# Patient Record
Sex: MALE | Race: OTHER | URBAN - METROPOLITAN AREA
[De-identification: names, ages, dates, MRNs, and addresses within clinical notes are randomized per-mention and may not be internally consistent; named-entity substitution may affect disease eponyms.]

---

## 2024-10-07 ENCOUNTER — EMERGENCY (EMERGENCY)
Facility: HOSPITAL | Age: 49
LOS: 0 days | Discharge: HOME | End: 2024-10-07
Attending: EMERGENCY MEDICINE
Payer: OTHER MISCELLANEOUS

## 2024-10-07 VITALS
RESPIRATION RATE: 17 BRPM | SYSTOLIC BLOOD PRESSURE: 151 MMHG | HEIGHT: 74 IN | DIASTOLIC BLOOD PRESSURE: 87 MMHG | HEART RATE: 68 BPM | OXYGEN SATURATION: 98 % | TEMPERATURE: 98 F | WEIGHT: 265 LBS
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
## 2024-10-07 DIAGNOSIS — M54.50 LOW BACK PAIN, UNSPECIFIED: ICD-10-CM
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
## 2024-10-07 PROCEDURE — 99053 MED SERV 10PM-8AM 24 HR FAC: CPT

## 2024-10-07 PROCEDURE — 99284 EMERGENCY DEPT VISIT MOD MDM: CPT

## 2024-10-07 RX ORDER — ACETAMINOPHEN 325 MG
975 TABLET ORAL ONCE
Refills: 0 | Status: COMPLETED | OUTPATIENT
Start: 2024-10-07 | End: 2024-10-07

## 2024-10-07 RX ORDER — LIDOCAINE 50 MG/G
1 CREAM TOPICAL ONCE
Refills: 0 | Status: COMPLETED | OUTPATIENT
Start: 2024-10-07 | End: 2024-10-07

## 2024-10-07 RX ORDER — LIDOCAINE 50 MG/G
1 CREAM TOPICAL
Qty: 1 | Refills: 0
Start: 2024-10-07

## 2024-10-07 RX ORDER — CYCLOBENZAPRINE HCL 10 MG
10 TABLET ORAL ONCE
Refills: 0 | Status: COMPLETED | OUTPATIENT
Start: 2024-10-07 | End: 2024-10-07

## 2024-10-07 RX ORDER — CYCLOBENZAPRINE HCL 10 MG
1 TABLET ORAL
Qty: 9 | Refills: 0
Start: 2024-10-07 | End: 2024-10-09

## 2024-10-07 RX ADMIN — Medication 975 MILLIGRAM(S): at 08:29

## 2024-10-07 RX ADMIN — Medication 10 MILLIGRAM(S): at 08:29

## 2024-10-07 RX ADMIN — Medication 975 MILLIGRAM(S): at 09:50

## 2024-10-07 RX ADMIN — Medication 600 MILLIGRAM(S): at 08:29

## 2024-10-07 RX ADMIN — LIDOCAINE 1 PATCH: 50 CREAM TOPICAL at 08:28

## 2024-10-07 RX ADMIN — Medication 600 MILLIGRAM(S): at 09:50

## 2024-10-07 NOTE — ED PROVIDER NOTE - OBJECTIVE STATEMENT
At about 5a today the patient was working (he is a [place] ) and after picking up some garbage he felt sudden non radiating left lower back pain superior to the buttocks. The pain is worse when he bends over. He told his supervisor and then he came to the ED. He has gotten mild relief with a heat pack.

## 2024-10-07 NOTE — ED PROVIDER NOTE - PATIENT PORTAL LINK FT
You can access the FollowMyHealth Patient Portal offered by [facility] by registering at the following website: [email or web address]. By joining [email or web address]’s FollowMyHealth portal, you will also be able to view your health information using other applications (apps) compatible with our system.

## 2024-10-07 NOTE — ED PROVIDER NOTE - CLINICAL SUMMARY MEDICAL DECISION MAKING FREE TEXT BOX
49M c/o sudden LBP after lifting a heavy object  -high concern for radiculopathy vs muscle spasm vs strain  -analgesia, muscle relaxants  -ortho spine referral

## 2024-10-07 NOTE — ED PROVIDER NOTE - CARE PROVIDER_API CALL
Raciel Simon  Orthopaedic Surgery  [address], [address]  [place] [address]  Phone: [phone]  Fax: [phone]  Follow Up Time: 4-6 Days

## 2024-10-07 NOTE — ED ADULT NURSE NOTE - MUSCULOSKELETAL ASSESSMENT
- - - Sski Pregnancy And Lactation Text: This medication is Pregnancy Category D and isn't considered safe during pregnancy. It is excreted in breast milk.

## 2024-10-07 NOTE — ED PROVIDER NOTE - NEUROLOGICAL, MLM
Alert and oriented, no focal deficits, no motor or sensory deficits. Sensation and strength of both legs normal and intact.

## 2024-10-07 NOTE — ED ADULT TRIAGE NOTE - CHIEF COMPLAINT QUOTE
Patient BIBA c/o generalized lower back worse on the left side after picking up trash can this morning while on the job. Patient state pain is sharp and worse with movement.   hx HTN, HLD

## 2024-10-07 NOTE — ED PROVIDER NOTE - MUSCULOSKELETAL, MLM
Spine appears normal, there is no c/t/l spinal tenderness. range of motion is not limited, no muscle or joint tenderness

## 2024-10-07 NOTE — ED ADULT NURSE NOTE - OBJECTIVE STATEMENT
Patient is a 49 year old male, alert & oriented x 4 BIBEMS for severe LL back pain and L upper buttock after picking up a heavy garbage. He works for [employer]. . PMHX: HTN, HLD. Denies tingling or numbness.